# Patient Record
Sex: FEMALE | Race: BLACK OR AFRICAN AMERICAN | NOT HISPANIC OR LATINO | Employment: PART TIME | URBAN - METROPOLITAN AREA
[De-identification: names, ages, dates, MRNs, and addresses within clinical notes are randomized per-mention and may not be internally consistent; named-entity substitution may affect disease eponyms.]

---

## 2017-11-16 ENCOUNTER — OFFICE VISIT - HEALTHEAST (OUTPATIENT)
Dept: FAMILY MEDICINE | Facility: CLINIC | Age: 47
End: 2017-11-16

## 2017-11-16 DIAGNOSIS — Z71.84 COUNSELING FOR TRAVEL: ICD-10-CM

## 2017-11-16 DIAGNOSIS — K50.00 CROHN'S DISEASE OF SMALL INTESTINE WITHOUT COMPLICATION (H): ICD-10-CM

## 2018-04-19 ENCOUNTER — OFFICE VISIT - HEALTHEAST (OUTPATIENT)
Dept: FAMILY MEDICINE | Facility: CLINIC | Age: 48
End: 2018-04-19

## 2018-04-19 DIAGNOSIS — R05.9 COUGH: ICD-10-CM

## 2018-04-19 DIAGNOSIS — Z12.31 VISIT FOR SCREENING MAMMOGRAM: ICD-10-CM

## 2018-10-26 ENCOUNTER — OFFICE VISIT - HEALTHEAST (OUTPATIENT)
Dept: FAMILY MEDICINE | Facility: CLINIC | Age: 48
End: 2018-10-26

## 2018-10-26 DIAGNOSIS — A08.4 VIRAL GASTROENTERITIS: ICD-10-CM

## 2019-04-29 ENCOUNTER — AMBULATORY - HEALTHEAST (OUTPATIENT)
Dept: CARE COORDINATION | Facility: CLINIC | Age: 49
End: 2019-04-29

## 2019-04-29 DIAGNOSIS — K50.919 CROHN'S DISEASE WITH COMPLICATION, UNSPECIFIED GASTROINTESTINAL TRACT LOCATION (H): ICD-10-CM

## 2019-05-01 ENCOUNTER — COMMUNICATION - HEALTHEAST (OUTPATIENT)
Dept: NURSING | Facility: CLINIC | Age: 49
End: 2019-05-01

## 2019-05-08 ENCOUNTER — AMBULATORY - HEALTHEAST (OUTPATIENT)
Dept: NURSING | Facility: CLINIC | Age: 49
End: 2019-05-08

## 2019-05-13 ENCOUNTER — COMMUNICATION - HEALTHEAST (OUTPATIENT)
Dept: NURSING | Facility: CLINIC | Age: 49
End: 2019-05-13

## 2019-05-28 ENCOUNTER — COMMUNICATION - HEALTHEAST (OUTPATIENT)
Dept: NURSING | Facility: CLINIC | Age: 49
End: 2019-05-28

## 2019-10-29 ENCOUNTER — COMMUNICATION - HEALTHEAST (OUTPATIENT)
Dept: CARE COORDINATION | Facility: CLINIC | Age: 49
End: 2019-10-29

## 2019-10-30 ENCOUNTER — OFFICE VISIT - HEALTHEAST (OUTPATIENT)
Dept: FAMILY MEDICINE | Facility: CLINIC | Age: 49
End: 2019-10-30

## 2019-10-30 DIAGNOSIS — L50.9 URTICARIA: ICD-10-CM

## 2019-10-30 DIAGNOSIS — K50.90 EXACERBATION OF CROHN'S DISEASE, WITHOUT COMPLICATIONS (H): ICD-10-CM

## 2019-11-14 ENCOUNTER — OFFICE VISIT - HEALTHEAST (OUTPATIENT)
Dept: FAMILY MEDICINE | Facility: CLINIC | Age: 49
End: 2019-11-14

## 2019-11-14 DIAGNOSIS — Z12.31 VISIT FOR SCREENING MAMMOGRAM: ICD-10-CM

## 2019-11-14 DIAGNOSIS — Z09 HOSPITAL DISCHARGE FOLLOW-UP: ICD-10-CM

## 2019-11-14 DIAGNOSIS — K50.90 EXACERBATION OF CROHN'S DISEASE, WITHOUT COMPLICATIONS (H): ICD-10-CM

## 2019-11-14 LAB
ALBUMIN SERPL-MCNC: 2.6 G/DL (ref 3.5–5)
ALP SERPL-CCNC: 72 U/L (ref 45–120)
ALT SERPL W P-5'-P-CCNC: 25 U/L (ref 0–45)
ANION GAP SERPL CALCULATED.3IONS-SCNC: 10 MMOL/L (ref 5–18)
AST SERPL W P-5'-P-CCNC: 13 U/L (ref 0–40)
BILIRUB SERPL-MCNC: 0.3 MG/DL (ref 0–1)
BUN SERPL-MCNC: 14 MG/DL (ref 8–22)
CALCIUM SERPL-MCNC: 8.5 MG/DL (ref 8.5–10.5)
CHLORIDE BLD-SCNC: 100 MMOL/L (ref 98–107)
CO2 SERPL-SCNC: 29 MMOL/L (ref 22–31)
CREAT SERPL-MCNC: 0.58 MG/DL (ref 0.6–1.1)
ERYTHROCYTE [DISTWIDTH] IN BLOOD BY AUTOMATED COUNT: 14.7 % (ref 11–14.5)
GFR SERPL CREATININE-BSD FRML MDRD: >60 ML/MIN/1.73M2
GLUCOSE BLD-MCNC: 98 MG/DL (ref 70–125)
HCT VFR BLD AUTO: 39.9 % (ref 35–47)
HGB BLD-MCNC: 13 G/DL (ref 12–16)
MCH RBC QN AUTO: 26.4 PG (ref 27–34)
MCHC RBC AUTO-ENTMCNC: 32.5 G/DL (ref 32–36)
MCV RBC AUTO: 81 FL (ref 80–100)
PLATELET # BLD AUTO: 251 THOU/UL (ref 140–440)
PMV BLD AUTO: 8.3 FL (ref 7–10)
POTASSIUM BLD-SCNC: 4.3 MMOL/L (ref 3.5–5)
PROT SERPL-MCNC: 5.3 G/DL (ref 6–8)
RBC # BLD AUTO: 4.92 MILL/UL (ref 3.8–5.4)
SODIUM SERPL-SCNC: 139 MMOL/L (ref 136–145)
WBC: 4.1 THOU/UL (ref 4–11)

## 2019-11-18 ENCOUNTER — COMMUNICATION - HEALTHEAST (OUTPATIENT)
Dept: FAMILY MEDICINE | Facility: CLINIC | Age: 49
End: 2019-11-18

## 2019-12-03 ENCOUNTER — RECORDS - HEALTHEAST (OUTPATIENT)
Dept: ADMINISTRATIVE | Facility: OTHER | Age: 49
End: 2019-12-03

## 2020-11-12 ENCOUNTER — OFFICE VISIT - HEALTHEAST (OUTPATIENT)
Dept: FAMILY MEDICINE | Facility: CLINIC | Age: 50
End: 2020-11-12

## 2020-11-12 DIAGNOSIS — K52.9 GASTROENTERITIS: ICD-10-CM

## 2020-11-12 ASSESSMENT — MIFFLIN-ST. JEOR: SCORE: 962.44

## 2021-02-11 ENCOUNTER — COMMUNICATION - HEALTHEAST (OUTPATIENT)
Dept: SCHEDULING | Facility: CLINIC | Age: 51
End: 2021-02-11

## 2021-02-16 ENCOUNTER — OFFICE VISIT - HEALTHEAST (OUTPATIENT)
Dept: FAMILY MEDICINE | Facility: CLINIC | Age: 51
End: 2021-02-16

## 2021-02-16 DIAGNOSIS — Z09 HOSPITAL DISCHARGE FOLLOW-UP: ICD-10-CM

## 2021-02-16 DIAGNOSIS — K50.00 CROHN'S DISEASE OF SMALL INTESTINE WITHOUT COMPLICATION (H): ICD-10-CM

## 2021-02-16 DIAGNOSIS — K56.609 SBO (SMALL BOWEL OBSTRUCTION) (H): ICD-10-CM

## 2021-02-23 ENCOUNTER — RECORDS - HEALTHEAST (OUTPATIENT)
Dept: ADMINISTRATIVE | Facility: OTHER | Age: 51
End: 2021-02-23

## 2021-02-23 LAB
ALT SERPL W/O P-5'-P-CCNC: 31 U/L (ref 0–78)
AST SERPL-CCNC: 14 U/L (ref 0–37)
CREAT SERPL-MCNC: 0.54 MG/DL (ref 0.6–1.02)
GFR ESTIMATE EXT - HISTORICAL: >60 ML/MIN/1.73M2
GFR ESTIMATE, IF BLACK EXT - HISTORICAL: >60 ML/MIN/1.73M2

## 2021-03-03 ENCOUNTER — RECORDS - HEALTHEAST (OUTPATIENT)
Dept: HEALTH INFORMATION MANAGEMENT | Facility: CLINIC | Age: 51
End: 2021-03-03

## 2021-05-20 ENCOUNTER — RECORDS - HEALTHEAST (OUTPATIENT)
Dept: ADMINISTRATIVE | Facility: OTHER | Age: 51
End: 2021-05-20

## 2021-05-28 NOTE — PROGRESS NOTES
"On 4/29/19, the patient was referred to Clinic Care Coordination via Hospital Discharge.    Order Diagnosis:  Crohn's disease with complication, unspecified gastrointestinal track location (H)    Order Comments:  None    Upon chart review I learned:    Patient was in [facility] from 4/25/19-4/28/19.    She was discharged with the following recommendations:    Follow up with PCP within 7 days.    Follow up with the [facility] in 3-4 weeks.    Get a repeat Chest CT in 6-12 months for solitary pulmonary nodule.    Recommend an out-patient pelvic MRI due to heterogenous uterus.    Care Guide attempted to call the patient.  The patient answered; however, stated she does not speak English.  I tried to ask what language she spoke; however, she only continued to repeat \"no English.\"  Her chart does not list any language other than English.    I consulted with Dr. Shultz.  She informed me the patient's  speaks English.  She doesn't know what the patient's language is.  She recommended I call the patient's .    Care Guide called the patient's , Merline.  He informed me the patient speaks Tigrinya.  He requested I inform him what I am calling about and he will relay it to his wife.    Clinic Care Coordination was described.   Merline will relay the information to the patient and notify me with her decision.    He had questions in regard to the recent hospitalization.  I discussed the patient needs a hospital follow up appointment with Dr. Shultz.  During this appointment, Dr. Shultz will review what transpired in the hospital and answer any additional questions they may have.  I assisted in coordinating a hospital follow up appointment for 5/8/19 at 3:00 with Dr. Shultz.  I had offered sooner appointments; however, Merline request the appointment be after Tuesday, 5/7/19.  He also stated the patient is only available for afternoon appointments, 3:00 or " later.    Merline informed me he will contact me with the patient's decision around enrollment with [facility] after the hospital follow up appointment on 5/8/19.  I informed him I will contact him a few days later if I haven't already heard back from him.    I have updated the patient's demographics page to reflect the language she speaks, Tigrinya.    [facility] Referral: Attempt 1  If the patient's  is calling me back, please transfer him to me, Jen Lara, at [phone].    Plan for Next Outreach:  1.  Review patient enrolling with Dr. Shultz so she can further discuss it during the patient's appointment.    Pending Appointments:  5/8/19 at 3:00 with Dr. Shultz for Hospital Follow Up  _________________________  Next Outreach Date: 5/13/19

## 2021-05-28 NOTE — PROGRESS NOTES
Care Guide spoke with Dr. Shultz's Clinical Assistant, Alley.  Informed her:  The patient was referred to CCC via hospital discharge.  I spoke with the patient's  and learned the language she speaks is Tigrinya.  I updated the chart to reflect this vs English.    I discussed the patient enrolling with [facility].  The  had many hospital discharge questions.  I assisted in scheduling the hospital follow up appointment this afternoon.    I requested Alley to let Dr. Shultz know I am trying to enroll the patient and if she could further discuss it, it would be appreciated.  I can try to meet with the patient while she is in clinic this afternoon, if I am available.

## 2021-05-28 NOTE — PROGRESS NOTES
On 4/29/19, the patient was referred to Clinic Care Coordination via Hospital Discharge.    Order Diagnosis:  Crohn's disease with complication, unspecified gastrointestinal track location (H)    Order Comments:  None    The patient no showed her hospital follow up appointment with Dr. Shultz on 5/8/19.     ID: [id number]  Agency: [employer]    [facility] Referral: Attempt 2  Care Guide called and left a message for the patient in order to discuss enrollment with Clinic Care Coordination.    If the patient is returning my call, please transfer her to me, Jen Lara, at [phone].    Plan for Next Outreach:  1.  Offer to assist in rescheduling the missed hospital follow up appointment with Dr. Shultz.    Pending Appointments:  None  _________________________  Next Outreach Date: 5/24/19

## 2021-05-29 NOTE — PROGRESS NOTES
On 4/29/19, the patient was referred to Clinic Care Coordination via Hospital Discharge.    Order Diagnosis:  Crohn's disease with complication, unspecified gastrointestinal track location (H)    Order Comments:  None     Name: Dave  ID: [id number]  Agency: [employer]    [facility] Referral: Attempt 3  Community Health Worker called and left a message for the patient in order to discuss enrollment with Clinic Care Coordination.  Per [facility] standard work, I have made 3 attempts to reach the patient to discuss enrollment and have been unsuccessful in reaching her.  At this time I will cease trying to reach the patient.    If the patient is returning my call, please transfer her to me, Jen Lara, at [phone].    In the future, if the patient's provider feels enrollment with [facility] would be beneficial to the patient, a new order can be placed and I will begin trying to reach the patient again.    I have sent a copy of this note to Dr. Shultz as an FYI.    5/28/19: Unreachable for enrollment with Clinic Care Coordination

## 2021-05-30 ENCOUNTER — RECORDS - HEALTHEAST (OUTPATIENT)
Dept: ADMINISTRATIVE | Facility: CLINIC | Age: 51
End: 2021-05-30

## 2021-05-31 ENCOUNTER — RECORDS - HEALTHEAST (OUTPATIENT)
Dept: ADMINISTRATIVE | Facility: CLINIC | Age: 51
End: 2021-05-31

## 2021-05-31 VITALS — BODY MASS INDEX: 19.42 KG/M2 | WEIGHT: 101.13 LBS

## 2021-06-01 VITALS — WEIGHT: 103.06 LBS | BODY MASS INDEX: 19.47 KG/M2

## 2021-06-02 VITALS — WEIGHT: 91 LBS | BODY MASS INDEX: 17.19 KG/M2

## 2021-06-02 NOTE — PROGRESS NOTES
Walk In Care Note                                                                                 Date of Visit: 10/30/2019     Chief Complaint   Brenda Archuleta is a(n) 49 y.o. Black or  female who presents to [facility] with the following complaint(s):  Rash (started last evening and is itchy all over   Denies fevers )       Assessment and Plan   1. Urticaria  - cetirizine (ZYRTEC) 10 MG tablet; Take 1 tablet (10 mg total) by mouth 2 (two) times a day.  Dispense: 60 tablet; Refill: 0  - diphenhydrAMINE (BENADRYL) 25 mg capsule; Take 1 capsule (25 mg total) by mouth every 4 (four) hours as needed for itching.  Dispense: 30 capsule; Refill: 1  - famotidine (PEPCID) 20 MG tablet; Take 1 tablet (20 mg total) by mouth 2 (two) times a day.  Dispense: 60 tablet; Refill: 0    2. Exacerbation of Crohn's disease, without complications (H)      Patient with Crohn's Disease, recently hospitalized for flare and started on prolonged prednisone taper, presenting with 1-day history of diffuse urticaria.  Patient has no signs of angioedema at this time.  Her blood pressure is slightly low today, but this is consistent with her blood pressure readings during her recent hospitalization.  Patient reports a remote history of similar rash, related to clothing that she was wearing at the time.  Patient has taken prednisone multiple times in the past, most recently in April, without issue.  Therefore doubt that urticaria is related to prednisone, and would be hesitant to discontinue prednisone due to risk of Crohn's Disease flare.  Treating urticaria with both H1 and H2 histamine blockade with cetirizine and famotidine twice daily for the next 30 days.  Have also prescribed diphenhydramine to be used as needed for pruritus.  Patient is scheduled for follow-up with her primary care provider tomorrow.  Recommended evaluation in the ED if her symptoms persist/worsen in the interim.    Counseled patient regarding assessment  and plan for evaluation and treatment. Questions were answered. See AVS for the specific written instructions and educational handout(s) regarding urticaria that were provided at the conclusion of the visit.     Discussed signs / symptoms that warrant urgent / emergent medical attention.     Follow up with primary care as scheduled tomorrow.     History of Present Illness   Primary symptom: Rash  Onset: Yesterday  Location: Initially on the face, then spread to the rest of the body over the course of the day.   Appearance: Red patches  Progression: Worsening  Pruritic: Yes  Painful: Tender  Discharge: No  Bleeding: No  Fevers: No  Chills: Yes  Associated sore throat: Mild  Preceding flu-like symptoms: No  Lip / tongue / face swelling: No  Throat / neck swelling: No  Difficulty speaking: No  Difficulty swallowing: No  Difficulty breathing: No  Additional symptoms: None. Was hospitalized from 10/25/2019-10/27/2019 for management of a Crohn's flare.  Abdominal pain has improved since initially being treated with IV methylprednisolone and transitioning to oral prednisone prior to discharge.  Home therapies utilized: None  History of similar rash: Yes, over 20 years ago, reportedly due to clothing she was wearing.   Contacts with similar rash: No  Known environmental exposure: No  New medication use: Started prednisone 20 mg two times a day on 10/26/2019 while hospitalized for a flare of Crohn's Disease. Has taken prednisone previously, most recently in April 2019, without any history of medication reaction.  New detergent / fabric softener exposure: No  New personal hygiene product exposure: No  Pet / animal exposure: No  Tobacco use / exposure: No     Review of Systems   Review of Systems   All other systems reviewed and are negative.       Physical Exam   Vitals:    10/30/19 0817   BP: (!) 84/52   Patient Site: Right Arm   Patient Position: Sitting   Cuff Size: Adult Small   Pulse: (!) 105   Resp: 18   Temp: 97.6  F  (36.4  C)   TempSrc: Oral   SpO2: 99%   Weight: (!) 88 lb 3.2 oz (40 kg)     Physical Exam  Vitals signs and nursing note reviewed.   Constitutional:       General: She is not in acute distress.     Appearance: She is well-developed and underweight. She is not ill-appearing or toxic-appearing.   HENT:      Head: Normocephalic and atraumatic.      Right Ear: Tympanic membrane, ear canal and external ear normal.      Left Ear: Tympanic membrane, ear canal and external ear normal.      Nose: No mucosal edema or rhinorrhea.      Mouth/Throat:      Lips: Pink. No lesions.      Mouth: Mucous membranes are moist. No oral lesions.      Tongue: No lesions.      Palate: No lesions.      Pharynx: Oropharynx is clear. No oropharyngeal exudate or posterior oropharyngeal erythema.   Eyes:      General: Lids are normal. No scleral icterus.     Conjunctiva/sclera: Conjunctivae normal.   Neck:      Musculoskeletal: Neck supple. No edema or erythema.   Cardiovascular:      Rate and Rhythm: Normal rate and regular rhythm.      Heart sounds: S1 normal and S2 normal. No murmur. No friction rub. No gallop.    Pulmonary:      Effort: Pulmonary effort is normal.      Breath sounds: Normal breath sounds. No stridor. No wheezing, rhonchi or rales.   Abdominal:      General: Bowel sounds are normal. There is no distension.      Palpations: Abdomen is soft. There is no hepatomegaly, splenomegaly or mass.      Tenderness: There is tenderness (mild) in the periumbilical area. There is no right CVA tenderness, left CVA tenderness, guarding or rebound.   Lymphadenopathy:      Cervical: No cervical adenopathy.   Skin:     General: Skin is warm and dry.      Coloration: Skin is not pale.      Findings: Rash present. Rash is urticarial (on the face, neck, back, abdomen, upper extremities, and lower extremities).   Neurological:      General: No focal deficit present.      Mental Status: She is alert and oriented to person, place, and time.           Diagnostic Studies   Laboratory:  N/A  Radiology:  N/A  Electrocardiogram:  N/A     Procedure Note   N/A     Pertinent History   The following portions of the patient's history were reviewed and updated as appropriate: allergies, current medications, past family history, past medical history, past social history, past surgical history and problem list.    Patient has Crohn's Disease; Latent Tuberculosis; Crohn's colitis, unspecified complication (H); Abdominal pain, epigastric; Acute Crohn's disease without complication (H); Crohn's disease of colon, unspecified complication (H); and Exacerbation of Crohn's disease, without complications (H) on their problem list.    Patient has a past medical history of Breast cyst (2009), Crohn's disease (H), and Latent Tuberculosis.    Patient has a past surgical history that includes Breast cyst excision (Left, 2009) and Partial small bowel resection.    Patient's family history is not on file.    Patient reports that she has never smoked. She has never used smokeless tobacco. She reports that she does not drink alcohol or use drugs.     Portions of this note have been dictated using voice recognition software. Any grammatical or context distortions are unintentional and inherent to the software.     Gustavo Sesay MD  [facility]

## 2021-06-02 NOTE — PROGRESS NOTES
TCM DISCHARGE FOLLOW UP CALL    Discharge Date:  10/27/2019  Reason for hospital stay (discharge diagnosis)::  Crohn's disease of colon  Are you feeling better, the same or worse since your discharge?:  Patient is feeling better  Do you feel like you have a plan in the event of a health emergency?: Yes (911)    As part of your discharge plan, were  home care services ordered for you?: No    Did you receive any new medications, or was there a change to your medications?: Yes    Are you taking those medications, or do you have any established regiment?:  Pt states she is taking prednisone 2 tabs (20 mg) twice a day for 1 week, then will decrease per taper instructions.  Do you have any follow up visits scheduled with your PCP or Specialist?:  Yes, with PCP  (RN) Is PCP appt scheduled soon enough (within 14 days of discharge date)?: Yes (Dr. Shultz 10/31/19)    RN NOTES::  Pt has not heard from GI clinic yet.      Kath Tijerina RN Care Manager, Population Health

## 2021-06-03 VITALS
BODY MASS INDEX: 14.84 KG/M2 | WEIGHT: 89.19 LBS | DIASTOLIC BLOOD PRESSURE: 58 MMHG | HEART RATE: 108 BPM | SYSTOLIC BLOOD PRESSURE: 96 MMHG

## 2021-06-03 VITALS
DIASTOLIC BLOOD PRESSURE: 52 MMHG | RESPIRATION RATE: 18 BRPM | TEMPERATURE: 97.6 F | HEART RATE: 105 BPM | WEIGHT: 88.2 LBS | BODY MASS INDEX: 14.68 KG/M2 | SYSTOLIC BLOOD PRESSURE: 84 MMHG | OXYGEN SATURATION: 99 %

## 2021-06-03 NOTE — PROGRESS NOTES
Hospital Follow-up Visit:    Assessment/Plan:     Hospital discharge follow-up  -     HM2(CBC w/o Differential)  -     Comprehensive Metabolic Panel  Improving  Hospital notes, lab results, imagings were reviewed.    Post Discharge Medication Reconciliation Status: discharge medications reconciled, continue medications without change    Exacerbation of Crohn's disease, without complications (H)  Still on prednisone taper  Will assist her with an appointment to see [facility]     Visit for screening mammogram  -     Mammo Screening Bilateral; Future    Subjective:     Brenda Archuleta is a 49 y.o. female who presents for a hospital discharge follow up.     She presented for evaluation of abdominal pain, vomiting, diarrhea, weight loss at the emergency department on 10/25. Labs demonstrated hemoglobin 13, no leukocytosis, hypoalbuminemia 2.3.  CT of the abdomen and pelvis demonstrated active Crohn's in the small bowel. Patient's symptoms improved quickly on IV Solu-Medrol. Patient was switched to oral prednisone taper and will need to follow-up in the IBD clinic to discuss maintenance treatment. Patient was having mildly symptomatic hypotension, probably multifactorial due to poor oral intake and pain medications. It responded well to IV fluids. On the day of discharge patient did not have any abdominal pain, was tolerating regular diet and ambulating without difficulties. She was [facility] on 10/27.     She has not scheduled an appointment with her specialist yet. She is feeling nauseous today. She has been taking her medication. She is still taking a prednisone. She has started eating again. Her stomach pain is under control. She no longer is experiencing diarrhea.     ROS:   All other systems are negative.     Hospital/Nursing Home/IP Rehab Facility: [facility]  Date of Admission: 10/26/19   Date of Discharge:10/27/19  Reason(s) for Admission:acute  chron's flare up            Do you have any  problems taking your medication regularly?  None       Have you had any changes in your medication since discharge? None       Have you had any difficulty following your discharge or treatment plan?  No    Summary of hospitalization:  Hospital discharge summary reviewed  Diagnostic Tests/Treatments reviewed.  Follow up needed: Patient has scheduled follow up with [facility] for further care.   Other Healthcare Providers Involved in Patient's Care: Patient Care Team:  Wai Jauregui MD as PCP - General  Wai Jauregui MD as Assigned PCP      Update since discharge: {improved     Post Discharge Medication Reconciliation: discharge medications reconciled, continue medications without change  Plan of care communicated with: patient    Objective:     Vitals:    11/14/19 1443   BP: 96/58   Pulse: (!) 108   Weight: (!) 89 lb 3 oz (40.5 kg)   LMP: 01/19/2018         Physical Exam:  Constitutional: Patient is oriented to person, place, and time. Patient appears well-developed and well-nourished. No distress.   Cardiovascular: Normal rate.  Pulmonary/Chest: Effort normal. No respiratory distress.   Neurological: Patient is alert and oriented to person, place, and time.      Coding guidelines for this visit:  Type of Medical   Decision Making Face-to-Face Visit       within 7 Days of discharge Face-to-Face Visit        within 14 days of discharge   Moderate Complexity [id number]   High Complexity 40798 [id number]       ADDITIONAL HISTORY SUMMARIZED (2): Reviewed discharge summary from 10/27/19 about Crohn's flare up.   DECISION TO OBTAIN EXTRA INFORMATION (1): None.   RADIOLOGY TESTS (1): Reviewed CT Abdomen Pelvis from 10/25/19 showing flare up of Crohns.   LABS (1): Reviewed labs from 10/25/19 and ordered labs today.   MEDICINE TESTS (1): None.  INDEPENDENT REVIEW (2 each): None.     Aura CRUZ, am scribing for and in the presence of, Dr. Shultz.    IDr. Shultz, personally performed  the services described in this documentation, as scribed by Aura Galan in my presence, and it is both accurate and complete.    Total data points: 4    Electronically signed by Wai Yarbrough MD 11/14/19 2:38 PM

## 2021-06-05 VITALS
BODY MASS INDEX: 16.86 KG/M2 | OXYGEN SATURATION: 95 % | DIASTOLIC BLOOD PRESSURE: 66 MMHG | WEIGHT: 89.3 LBS | SYSTOLIC BLOOD PRESSURE: 94 MMHG | HEART RATE: 106 BPM | TEMPERATURE: 97.9 F | HEIGHT: 61 IN

## 2021-06-05 VITALS
SYSTOLIC BLOOD PRESSURE: 100 MMHG | TEMPERATURE: 97.6 F | DIASTOLIC BLOOD PRESSURE: 60 MMHG | HEART RATE: 88 BPM | OXYGEN SATURATION: 97 % | WEIGHT: 91.31 LBS | BODY MASS INDEX: 17.83 KG/M2

## 2021-06-13 NOTE — PROGRESS NOTES
"ASSESSMENT:  1. Gastroenteritis    She is feeling better now, back to her baseline.  She needs a note for work and I did provide that for her and that can be printed out given to the patient today.  If she is having further problems or does seem to be getting worse instead of better she will let us know.          PLAN:  There are no Patient Instructions on file for this visit.    No orders of the defined types were placed in this encounter.    Medications Discontinued During This Encounter   Medication Reason     predniSONE (DELTASONE) 10 mg tablet Therapy completed       No follow-ups on file.    CHIEF COMPLAINT:  Chief Complaint   Patient presents with     Abdominal Pain     Abdomen pain and vomitting x 3 days, stomach pain after vomiting, tired, will have chills all the way up her back and back pain after vomiting.        HISTORY OF PRESENT ILLNESS:  Brenda is a 50 y.o. female presenting to the clinic today with some nausea and vomiting recently.  She does have a history of Crohn's disease but that is been pretty stable.  In the last couple of days she has had several issues with vomiting and some nausea.  She has had slight diarrhea but all these things seem to be getting better.  She thinks it was something that she ate.  She said no fevers or chills.  She has had no cough or other Covid symptoms.  She needs a note to go back to work as she missed 3 days this week.    REVIEW OF SYSTEMS:     All other systems are negative.    PFSH:      TOBACCO USE:  Social History     Tobacco Use   Smoking Status Never Smoker   Smokeless Tobacco Never Used       VITALS:  Vitals:    11/12/20 1433   BP: 94/66   Patient Site: Left Arm   Patient Position: Sitting   Cuff Size: Child   Pulse: (!) 106   Temp: 97.9  F (36.6  C)   SpO2: 95%   Weight: (!) 89 lb 4.8 oz (40.5 kg)   Height: 5' 1\" (1.549 m)     Wt Readings from Last 3 Encounters:   11/12/20 (!) 89 lb 4.8 oz (40.5 kg)   11/14/19 (!) 89 lb 3 oz (40.5 kg)   10/30/19 (!) 88 lb " 3.2 oz (40 kg)     Body mass index is 16.87 kg/m .    PHYSICAL EXAM:  Constitutional:  Well appearing patient in no acute distress.  Vitals:  Per nursing notes.    HEENT:  Normocephalic, atraumatic.  Ears are clear bilaterally, with no fluid or redness, and landmarks visible.  Pupils are equal and reactive to light, extraocular muscles intact, visual fields are full.  Nose is normal, and oropharynx is clear without redness.    Neck is without lymphadenopathy.    Lungs:  Clear to auscultation bilaterally without wheezes, rales or rhonchi.   Cardiac:  Regular rate and rhythm without murmurs, rubs, or gallops.     Legs show no cyanosis, clubbing or edema.  Palpation of the distal pulses are normal and symmetric.          MEDICATIONS:  Current Outpatient Medications   Medication Sig Dispense Refill     cholecalciferol, vitamin D3, (VITAMIN D3 ORAL) Take by mouth.       No current facility-administered medications for this visit.

## 2021-06-14 NOTE — PROGRESS NOTES
ASSESSMENT/PLAN:  Counseling for travel  Discussed travel safety and prevention:  malaria chemoprophylaxis.  We discussed food and waterborne precautions.  Personal safety, sunscreen, insect precautions, traveler's diarrhea prevention & treatment.  [facility] travel information provided.  She received her flu vaccine at work  -     TYPHOID, INACTIVE  -     mefloquine (LARIAM) 250 mg tablet; Take 1 tablet (250 mg total) by mouth every 7 days. Take one tablet weekly until finish  Dispense: 14 tablet; Refill: 0  -     loperamide (IMODIUM A-D) 2 mg tablet; 4 mg after first loose stool, followed by 2 mg after each subsequent stool (maximum dose: 8 mg/day)  Dispense: 30 tablet; Refill: 0  -     ciprofloxacin HCl (CIPRO) 500 MG tablet; Take 1 tablet (500 mg total) by mouth 2 (two) times a day for 3 days.  Dispense: 6 tablet; Refill: 0    Crohn's disease of small intestine without complication  Stable  Not on medication    Orders Placed This Encounter   Procedures     TYPHOID, INACTIVE         CHIEF COMPLAINT:  Chief Complaint   Patient presents with     Travel Consult     [facility] 11/29/17 for 2 months       HISTORY OF PRESENT ILLNESS:  Brenda is a 47 y.o. female presenting to the clinic today for a travel consult. She will be traveling to [facility] to visit family. She will be visiting family there. She is traveling alone; her  and children are staying here. She will need malaria prophylaxis. She is in need of a typhoid vaccine. She already had her seasonal flu shot.   Destination: [facility]  Departure Date: November 29, 2017  Duration: 2 months      REVIEW OF SYSTEMS:   Constitutional: Negative.   HENT: Negative.   Eyes: Negative.   Respiratory: Negative.   Cardiovascular: Negative.   Gastrointestinal: Negative.   Endocrine: Negative.   Genitourinary: Negative.   Musculoskeletal: Negative.   Skin: Negative.   Allergic/Immunologic: Negative.   Neurological: Negative.   Hematological: Negative.   Psychiatric/Behavioral:  Negative.   All other systems are negative.    PFSH:  No new history.     TOBACCO USE:  History   Smoking Status     Never Smoker   Smokeless Tobacco     Never Used       VITALS:  Vitals:    11/16/17 1113   BP: 124/74   Pulse: 68   Weight: 101 lb 2 oz (45.9 kg)     Wt Readings from Last 3 Encounters:   11/16/17 101 lb 2 oz (45.9 kg)   11/03/16 102 lb 4.8 oz (46.4 kg)   04/27/16 101 lb 7 oz (46 kg)       PHYSICAL EXAM:  Constitutional: Patient is oriented to person, place, and time. Patient appears well-developed and well-nourished. No distress.   Head: Normocephalic and atraumatic.   Right Ear: External ear normal. Ear canal and TM normal.   Left Ear: External ear normal. Ear canal and TM normal.   Nose: Nose normal.   Mouth/Throat: Oropharynx is clear and moist. No oropharyngeal exudate.   Eyes: Conjunctivae and EOM are normal. Pupils are equal, round, and reactive to light. Right eye exhibits no discharge. Left eye exhibits no discharge. No scleral icterus.   Cardiovascular: Normal rate, regular rhythm, normal heart sounds and intact distal pulses. No murmur heard.   Pulmonary/Chest: Effort normal and breath sounds normal. No stridor. No respiratory distress. Patient has no wheezes, no rales, exhibits no tenderness.   Abdominal: Soft. Bowel sounds are normal. Patient exhibits no distension and no mass. There is no tenderness. There is no rebound and no guarding.   Skin: Skin is warm and dry. No rash noted. Patient is not diaphoretic. No erythema. No pallor.        ADDITIONAL HISTORY SUMMARIZED (2): None.  DECISION TO OBTAIN EXTRA INFORMATION (1): None.   RADIOLOGY TESTS (1): None.  LABS (1): None.  MEDICINE TESTS (1): None.  INDEPENDENT REVIEW (2 each): None.     IRebecca, am scribing for and in the presence of, Dr. Shultz.    Wai CRUZ MD , personally performed the services described in this documentation, as scribed by Rebecca Snider in my presence, and it is both  accurate and complete.    MEDICATIONS:  Current Outpatient Prescriptions   Medication Sig Dispense Refill     CHOLECALCIFEROL, VITAMIN D3, (VITAMIN D3 ORAL) Take by mouth.       ciprofloxacin HCl (CIPRO) 500 MG tablet Take 1 tablet (500 mg total) by mouth 2 (two) times a day for 3 days. 6 tablet 0     loperamide (IMODIUM A-D) 2 mg tablet 4 mg after first loose stool, followed by 2 mg after each subsequent stool (maximum dose: 8 mg/day) 30 tablet 0     mefloquine (LARIAM) 250 mg tablet Take 1 tablet (250 mg total) by mouth every 7 days. Take one tablet weekly until finish 14 tablet 0     triamcinolone (KENALOG) 0.1 % cream Apply three times daily 30 g 0     No current facility-administered medications for this visit.        Total data points: 0

## 2021-06-15 NOTE — PROGRESS NOTES
Hospital Follow-up Visit:    Assessment/Plan:     Brenda was seen today for hospital visit follow up.    Hospital discharge follow-up  Improving  Hospital notes, lab results, operative notes, imagings were reviewed.    Post Discharge Medication Reconciliation Status: discharge medications reconciled, continue medications without change    SBO (small bowel obstruction) (H)  Feeling better.  Tolerating oral diet and having adequate bowel movements  Letter was provided to her to return to work on March 1, 2021    Crohn's disease of small intestine without complication (H)  She is on a prednisone taper.  She has follow-up with [place] [facility] next week     Subjective:     Brenda Archuleta is a 50 y.o. female who presents for a hospital discharge follow up.      Hospital/Nursing Home/ Rehab Facility: [facility]  Date of Admission: 2/10/2021  Date of Discharge:2/12/2021  Reason(s) for Admission: stomach pain            Do you have any problems taking your medication regularly?  None       Have you had any changes in your medication since discharge? None       Have you had any difficulty following your discharge or treatment plan?  No    Summary of hospitalization:  Hospital discharge summary reviewed  Diagnostic Tests/Treatments reviewed.  Follow up needed: has f/u with [facility] next week  Other Healthcare Providers Involved in Patient's Care: Patient Care Team:  Wai Jauregui MD as PCP - Ge Puente MD as Assigned PCP      Update since discharge: {improved     Post Discharge Medication Reconciliation: discharge medications reconciled, continue medications without change  Plan of care communicated with: patient    Objective:     Vitals:    02/16/21 1310   BP: 100/60   Pulse: 88   Temp: 97.6  F (36.4  C)   TempSrc: Oral   SpO2: 97%   Weight: (!) 91 lb 5 oz (41.4 kg)   LMP: 01/19/2018         Physical Exam:    Objective:    Physical Exam   Vitals:    02/16/21 1310   BP: 100/60    Pulse: 88   Temp: 97.6  F (36.4  C)   SpO2: 97%      Constitutional: Patient is oriented to person, place, and time. Patient appears well-developed and well-nourished. No distress.   Head: Normocephalic and atraumatic.   Right Ear: External ear normal.   Left Ear: External ear normal.   Eyes: Conjunctivae and EOM are normal. Right eye exhibits no discharge. Left eye exhibits no discharge. No scleral icterus.   Neck: Neck supple. No JVD present. No tracheal deviation present. No thyromegaly present.   Cardiovascular: Normal rate, regular rhythm, normal heart sounds and intact distal pulses. No murmur heard.   Pulmonary/Chest: Effort normal and breath sounds normal. No stridor. No respiratory distress. Patient has no wheezes, no rales, exhibits no tenderness.   Abdominal: Soft. Bowel sounds are normal. Patient exhibits no distension and no mass. There is no tenderness. There is no rebound and no guarding.   Skin: Skin is warm and dry. No rash noted. Patient is not diaphoretic. No erythema. No pallor.         Coding guidelines for this visit:  Type of Medical   Decision Making Face-to-Face Visit       within 7 Days of discharge Face-to-Face Visit        within 14 days of discharge   Moderate Complexity [id number]   High Complexity [id number]       Electronically signed by Wai Yarbrough MD 02/16/21 1:06 PM

## 2021-06-16 PROBLEM — K50.90: Status: ACTIVE | Noted: 2019-10-27

## 2021-06-16 PROBLEM — K56.609 SBO (SMALL BOWEL OBSTRUCTION) (H): Status: ACTIVE | Noted: 2021-02-11

## 2021-06-17 NOTE — PATIENT INSTRUCTIONS - HE
Patient Instructions by Gustavo Sesay MD at 10/30/2019  8:20 AM     Author: Gustavo Sesay MD Service: -- Author Type: Physician    Filed: 10/30/2019  8:51 AM Encounter Date: 10/30/2019 Status: Addendum    : Gustavo Sesay MD (Physician)    Related Notes: Original Note by Gustavo Sesay MD (Physician) filed at 10/30/2019  8:50 AM       -Continue taking prednisone as directed for your Crohn's flare.  -Take both cetirizine and famotidine twice daily for your hives.  These have been prescribed for 30 days.  -Take diphenhydramine as needed for itching.  -Please follow-up with Dr. Shultz as scheduled tomorrow.  Please discuss possible referral to Allergy with her.  -Please go to the nearest Emergency Department if your rash continues to worsen despite taking your medications as prescribed or if you develop swelling of the lips/tongue/throat/neck, difficulty speaking/swallowing/breathing, or other concerning symptoms.  Patient Education     Hives (Adult)  Hives are pink or red bumps on the skin. These bumps are also known as wheals. The bumps can itch, burn, or sting. Hives can occur anywhere on the body. They vary in size and shape and can form in clusters. Individual hives can appear and go away quickly. New hives may develop as old ones fade. Hives are common and usually harmless. Occasionally hives are a sign of a serious allergy.  Hives are often caused by an allergic reaction. It may be an allergic reaction to foods such as fruit, shellfish, chocolate, nuts, or tomatoes. It may be a reaction to pollens, animal fur, or mold spores. Medicines, chemicals, and insect bites can also cause hives. And hives can be caused by hot sun or cold air. The cause of hives can be difficult to find.  You may be given medicines to relieve swelling and itching. Follow all instructions when using these medicines. The hives will usually fade in a few days, but can last up to 2 weeks.  Home care  Follow these  tips:    Try to find the cause of the hives and eliminate it. Discuss possible causes with your healthcare provider. Future reactions to the same allergen may be worse.    Dont scratch the hives. Scratching will delay healing. To reduce itching, apply cool, wet compresses to the skin.    Dress in soft, loose cotton clothing.    Dont bathe in hot water. This can make the itching worse.    Apply an ice pack or cool pack wrapped in a thin towel to your skin. This will help reduce redness and itching. But if your hives were caused by exposure to cold, then do not apply more cold to them.    You may use over-the counter antihistamines to reduce itching. Some older antihistamines, such as diphenhydramine and chlorpheniramine, are inexpensive. But they need to be taken often and may make you sleepy. They are best used at bedtime. Dont use diphenhydramine if you have glaucoma or have trouble urinating because of an enlarged prostate. Newer antihistamines, such as loratadine, cetirizine, and fexofenadine, are generally more expensive. But they tend to have fewer side effects, such as drowsiness. They can be taken less often.    Another type of antihistamine is used to treat heartburn. This type includes ranitidine, nizatidine, famotidine, and cimetidine. These are sometimes used along with the above antihistamines if a single medicine is not working.  Follow-up care  Follow up with your healthcare provider if your symptoms don't get better in 2 days. Ask your provider about allergy testing if you have had a severe reaction, or have had several episodes of hives. He or she can use the allergy testing to find out what you are allergic to.  When to seek medical advice  Call your healthcare provider right away if any of these occur:    Fever of 100.4 F (38.0 C) or higher, or as directed by your healthcare provider    Redness, swelling, or pain    Foul-smelling fluid coming from the rash  Call 911  Call 911 if any of the  following occur:    Swelling of the face, throat, or tongue    Trouble breathing or swallowing    Dizziness, weakness, or fainting  Date Last Reviewed: 9/1/2016 2000-2017 [employer]. [address] [place] [address], [place] [address]. All rights reserved. This information is not intended as a substitute for professional medical care. Always follow your healthcare professional's instructions.

## 2021-06-17 NOTE — PROGRESS NOTES
ASSESSMENT/PLAN:    Cough  Pleasant 47-year-old female presented with 2 weeks of nonproductive cough.  She endorsed reflux symptoms as well.  Suspect that her cough may be related to that of gastroesophageal reflux and upper respiratory tract infection.  We will give her guaifenesin with codeine and omeprazole.  She will follow-up in 2 weeks if she is not better.  She verbalized understanding and agreed with the plan  -     codeine-guaiFENesin (GUAIFENESIN AC)  mg/5 mL liquid; Take 10 mL by mouth 2 (two) times a day as needed for cough.  Dispense: 240 mL; Refill: 0  -     omeprazole (PRILOSEC) 20 MG capsule; Take 1 capsule (20 mg total) by mouth daily.  Dispense: 30 capsule; Refill: 1      CHIEF COMPLAINT:  Chief Complaint   Patient presents with     Cough     x 2 weeks     Emesis     once  yesterday       HISTORY OF PRESENT ILLNESS:  Brenda is a 47 y.o. female presenting to the clinic today for a cough. She has had a cough for the past 2 weeks. She has not had fevers, chills or body aches. She had one episode of post tussive emesis yesterday. The cough has been dry for her. Denies nausea, headache, abdominal pain or diarrhea. She has not noticed any wheezing. She denies any sore throat. She does have history of reflux/heartburn, but is not taking medications. In the morning, she has an odd sensation on the tongue. Denies runny nose, post nasal drip or allergies. Her vitals are stable today.     Health Maintenance: She is due for a mammogram. She is due for a tetanus booster.     REVIEW OF SYSTEMS:   Constitutional: Negative.   HENT: Negative.   Eyes: Negative.   Cardiovascular: Negative.   Endocrine: Negative.   Genitourinary: Negative.   Musculoskeletal: Negative.   Skin: Negative.   Allergic/Immunologic: Negative.   Neurological: Negative.   Hematological: Negative.   Psychiatric/Behavioral: Negative.   All other systems are negative.    PFSH:  No new history.     TOBACCO USE:  History   Smoking Status      Never Smoker   Smokeless Tobacco     Never Used       VITALS:  Vitals:    04/19/18 1555   BP: 104/68   Pulse: 93   Temp: 97.9  F (36.6  C)   TempSrc: Oral   SpO2: 99%   Weight: 103 lb 1 oz (46.7 kg)     Wt Readings from Last 3 Encounters:   04/19/18 103 lb 1 oz (46.7 kg)   02/09/18 105 lb (47.6 kg)   11/16/17 101 lb 2 oz (45.9 kg)       PHYSICAL EXAM:  Constitutional: Patient is oriented to person, place, and time. Patient appears well-developed and well-nourished. No distress.   Head: Normocephalic and atraumatic.   Right Ear: External ear normal. Ear canal and TM normal.   Left Ear: External ear normal. Ear canal and TM normal.   Nose: Nose normal.   Mouth/Throat: Oropharynx is clear and moist. No oropharyngeal exudate.   Eyes: Conjunctivae and EOM are normal. Pupils are equal, round, and reactive to light. Right eye exhibits no discharge. Left eye exhibits no discharge. No scleral icterus.   Cardiovascular: Normal rate, regular rhythm, normal heart sounds and intact distal pulses. No murmur heard.   Pulmonary/Chest: Effort normal and breath sounds normal. No stridor. No respiratory distress. Patient has no wheezes, no rales, exhibits no tenderness.       Results for orders placed or performed during the hospital encounter of 02/09/18   Influenza A/B Rapid Test   Result Value Ref Range    Influenza  A, Rapid Antigen No Influenza A antigen detected No Influenza A antigen detected    Influenza B, Rapid Antigen No Influenza B antigen detected No Influenza B antigen detected   Culture, Urine   Result Value Ref Range    Culture Mixture of urogenital organisms    Basic Metabolic Panel   Result Value Ref Range    Sodium 137 136 - 145 mmol/L    Potassium 3.4 (L) 3.5 - 5.0 mmol/L    Chloride 107 98 - 107 mmol/L    CO2 22 22 - 31 mmol/L    Anion Gap, Calculation 8 5 - 18 mmol/L    Glucose 111 70 - 125 mg/dL    Calcium 8.2 (L) 8.5 - 10.5 mg/dL    BUN 11 8 - 22 mg/dL    Creatinine 0.51 (L) 0.60 - 1.10 mg/dL    GFR MDRD Af  Amer >60 >60 mL/min/1.73m2    GFR MDRD Non Af Amer >60 >60 mL/min/1.73m2   Urinalysis-UC if Indicated   Result Value Ref Range    Color, UA Yellow Colorless, Yellow, Straw, Light Yellow    Clarity, UA Clear Clear    Glucose, UA Negative Negative    Bilirubin, UA Moderate (!) Negative    Ketones, UA Negative Negative    Specific Gravity, UA 1.035 (H) 1.001 - 1.030    Blood, UA Negative Negative    pH, UA 6.0 4.5 - 8.0    Protein, UA 30 mg/dL (!) Negative mg/dL    Urobilinogen, UA <2.0 E.U./dL <2.0 E.U./dL, 2.0 E.U./dL    Nitrite, UA Negative Negative    Leukocytes, UA Small (!) Negative    Bacteria, UA Few (!) None Seen hpf    RBC, UA 3-5 (!) None Seen, 0-2 hpf    WBC, UA 5-10 (!) None Seen, 0-5 hpf    Squam Epithel, UA  (!) None Seen, 0-5 lpf    Mucus, UA Few (!) None Seen lpf   HM1 (CBC with Diff)   Result Value Ref Range    WBC 5.4 4.0 - 11.0 thou/uL    RBC 4.83 3.80 - 5.40 mill/uL    Hemoglobin 12.5 12.0 - 16.0 g/dL    Hematocrit 37.6 35.0 - 47.0 %    MCV 78 (L) 80 - 100 fL    MCH 25.9 (L) 27.0 - 34.0 pg    MCHC 33.2 32.0 - 36.0 g/dL    RDW 14.2 11.0 - 14.5 %    Platelets 227 140 - 440 thou/uL    MPV 11.2 8.5 - 12.5 fL    Neutrophils % 81 (H) 50 - 70 %    Lymphocytes % 13 (L) 20 - 40 %    Monocytes % 5 2 - 10 %    Eosinophils % 1 0 - 6 %    Basophils % 0 0 - 2 %    Neutrophils Absolute 4.3 2.0 - 7.7 thou/uL    Lymphocytes Absolute 0.7 (L) 0.8 - 4.4 thou/uL    Monocytes Absolute 0.3 0.0 - 0.9 thou/uL    Eosinophils Absolute 0.0 0.0 - 0.4 thou/uL    Basophils Absolute 0.0 0.0 - 0.2 thou/uL           ADDITIONAL HISTORY SUMMARIZED (2): None.  DECISION TO OBTAIN EXTRA INFORMATION (1): None.   RADIOLOGY TESTS (1): None.  LABS (1): None.  MEDICINE TESTS (1): None.  INDEPENDENT REVIEW (2 each): None.     Rebecca CRUZ, am scribing for and in the presence of, Dr. Shultz.    aWi CRUZ MD , personally performed the services described in this documentation, as scribed by Rebecca  Landry Snider in my presence, and it is both accurate and complete.    MEDICATIONS:  Current Outpatient Prescriptions   Medication Sig Dispense Refill     codeine-guaiFENesin (GUAIFENESIN AC)  mg/5 mL liquid Take 10 mL by mouth 2 (two) times a day as needed for cough. 240 mL 0     omeprazole (PRILOSEC) 20 MG capsule Take 1 capsule (20 mg total) by mouth daily. 30 capsule 1     No current facility-administered medications for this visit.        Total data points: 0

## 2021-06-19 NOTE — LETTER
Letter by Wai Jauregui MD at      Author: Wai Jauregui MD Service: -- Author Type: --    Filed:  Encounter Date: 11/18/2019 Status: Signed         Brenda KALPESH Archuleta  [address]  [address]  [facility] [address]             November 18, 2019         Dear Ms. Archuleta,    Below are the results from your recent visit:    Resulted Orders   HM2(CBC w/o Differential)   Result Value Ref Range    WBC 4.1 4.0 - 11.0 thou/uL    RBC 4.92 3.80 - 5.40 mill/uL    Hemoglobin 13.0 12.0 - 16.0 g/dL    Hematocrit 39.9 35.0 - 47.0 %    MCV 81 80 - 100 fL    MCH 26.4 (L) 27.0 - 34.0 pg    MCHC 32.5 32.0 - 36.0 g/dL    RDW 14.7 (H) 11.0 - 14.5 %    Platelets 251 140 - 440 thou/uL    MPV 8.3 7.0 - 10.0 fL   Comprehensive Metabolic Panel   Result Value Ref Range    Sodium 139 136 - 145 mmol/L    Potassium 4.3 3.5 - 5.0 mmol/L    Chloride 100 98 - 107 mmol/L    CO2 29 22 - 31 mmol/L    Anion Gap, Calculation 10 5 - 18 mmol/L    Glucose 98 70 - 125 mg/dL    BUN 14 8 - 22 mg/dL    Creatinine 0.58 (L) 0.60 - 1.10 mg/dL    GFR MDRD Af Amer >60 >60 mL/min/1.73m2    GFR MDRD Non Af Amer >60 >60 mL/min/1.73m2    Bilirubin, Total 0.3 0.0 - 1.0 mg/dL    Calcium 8.5 8.5 - 10.5 mg/dL    Protein, Total 5.3 (L) 6.0 - 8.0 g/dL    Albumin 2.6 (L) 3.5 - 5.0 g/dL    Alkaline Phosphatase 72 45 - 120 U/L    AST 13 0 - 40 U/L    ALT 25 0 - 45 U/L    Narrative    Fasting Glucose reference range is 70-99 mg/dL per  American Diabetes Association (ADA) guidelines.       Thank you for allowing me to be a part of your care. This note is to inform you that your results are stable. I will be glad to go over any results with you in details. Please don't hesitate to call the clinic. Have a happy, safe, and healthy year.     Sincerely,        Electronically signed by Wai Yarbrough MD

## 2021-06-21 NOTE — LETTER
Letter by Wai Jauregui MD at      Author: Wai Jauregui MD Service: -- Author Type: --    Filed:  Encounter Date: 2/16/2021 Status: (Other)         February 16, 2021     Patient: Brenda Archuleta   YOB: 1970   Date of Visit: 2/16/2021       To Whom it May Concern:    Brenda Archuleta was seen in my clinic on 2/16/2021. She may return to work 3/1/21.    If you have any questions or concerns, please don't hesitate to call.    Sincerely,         Electronically signed by Wai Yarbrough MD

## 2021-06-21 NOTE — LETTER
Letter by Ge Covarrubias MD at      Author: Ge Covarrubias MD Service: -- Author Type: --    Filed:  Encounter Date: 11/12/2020 Status: (Other)         November 12, 2020     Patient: Brenda Archuleta   YOB: 1970   Date of Visit: 11/12/2020       To Whom It May Concern:    It is my medical opinion that Brenda Archuleta needed to be out of work 11/10-11/12/2020 due to illness.  She may return to work on 11/16/2020..    If you have any questions or concerns, please don't hesitate to call.    Sincerely,        Electronically signed by Ge Covarrubias MD

## 2021-06-21 NOTE — PROGRESS NOTES
WALK IN CARE - VISIT NOTE    HPI    49 yo female presenting for evaluation of:    1. Diarrhea and vomiting  - patient has had 2-3 episodes of loose stools x7 days  - over last 2 days, loose stools have improved  - patient felt mildly nauseous last night and had one episode of emesis this AM  - has been able to eat and hold her food down after that   - had 1 stool this morning that was more formed than prior  - no abdominal pain/cramping, no fevers/chills/sweats  - no light headedness, no dizziness, no fatigue  - no changes to diet, no new foods, no new exposure  - no recent antibiotic use   ROS  Negative except as noted in HPI    OBJECTIVE    Vitals  Vitals:    10/26/18 1026   Pulse: 100   Temp: 97  F (36.1  C)   SpO2: 97%     Vitals:    10/26/18 1026   Pulse: 100   Temp: 97  F (36.1  C)   TempSrc: Oral   SpO2: 97%   Weight: (!) 91 lb (41.3 kg)       Physical Exam  General: No acute distress  Eyes: EOMI, PERRLA, normal conjunctiva, normal sclera   Nose: moist mucosa, no rhinorrhea  Oral: moist oral mucosa, no tonsillar exudates, no erythema  Lymph: no lymphadenopathy  CV: RRR, distal and peripheral pulses in tact and strong  Resp: CTA bilaterally, no wheezing, no rhonchi, no rhales, no respiratory distress  Abdomen: soft, non-tender, non-distended, active bowel sounds   Extrem: no edema, no cyanosis, well-perfused    Labs  N/A      ASSESSMENT/PLAN      # Viral gastroenteritis  - improving  - re-assurance given  - zofran and immodium PRN - 2 day supply given  - symptoms with which to go to ER discussed

## 2021-07-29 ENCOUNTER — TRANSFERRED RECORDS (OUTPATIENT)
Dept: HEALTH INFORMATION MANAGEMENT | Facility: CLINIC | Age: 51
End: 2021-07-29

## 2021-12-02 ENCOUNTER — TRANSFERRED RECORDS (OUTPATIENT)
Dept: HEALTH INFORMATION MANAGEMENT | Facility: CLINIC | Age: 51
End: 2021-12-02
Payer: COMMERCIAL

## 2022-04-12 ENCOUNTER — OFFICE VISIT (OUTPATIENT)
Dept: FAMILY MEDICINE | Facility: CLINIC | Age: 52
End: 2022-04-12

## 2022-04-12 VITALS
DIASTOLIC BLOOD PRESSURE: 62 MMHG | BODY MASS INDEX: 18.12 KG/M2 | OXYGEN SATURATION: 99 % | WEIGHT: 92.8 LBS | SYSTOLIC BLOOD PRESSURE: 98 MMHG | HEART RATE: 95 BPM

## 2022-04-12 DIAGNOSIS — R60.0 PEDAL EDEMA: Primary | ICD-10-CM

## 2022-04-12 LAB
ALBUMIN SERPL-MCNC: 1.2 G/DL (ref 3.5–5)
ALBUMIN UR-MCNC: NEGATIVE MG/DL
ALP SERPL-CCNC: 123 U/L (ref 45–120)
ALT SERPL W P-5'-P-CCNC: 18 U/L (ref 0–45)
ANION GAP SERPL CALCULATED.3IONS-SCNC: 9 MMOL/L (ref 5–18)
APPEARANCE UR: CLEAR
AST SERPL W P-5'-P-CCNC: 23 U/L (ref 0–40)
BACTERIA #/AREA URNS HPF: ABNORMAL /HPF
BILIRUB SERPL-MCNC: 0.3 MG/DL (ref 0–1)
BILIRUB UR QL STRIP: NEGATIVE
BUN SERPL-MCNC: 8 MG/DL (ref 8–22)
CALCIUM SERPL-MCNC: 7.3 MG/DL (ref 8.5–10.5)
CHLORIDE BLD-SCNC: 104 MMOL/L (ref 98–107)
CO2 SERPL-SCNC: 25 MMOL/L (ref 22–31)
COLOR UR AUTO: YELLOW
CREAT SERPL-MCNC: 0.53 MG/DL (ref 0.6–1.1)
GFR SERPL CREATININE-BSD FRML MDRD: >90 ML/MIN/1.73M2
GLUCOSE BLD-MCNC: 78 MG/DL (ref 70–125)
GLUCOSE UR STRIP-MCNC: NEGATIVE MG/DL
HGB UR QL STRIP: NEGATIVE
KETONES UR STRIP-MCNC: NEGATIVE MG/DL
LEUKOCYTE ESTERASE UR QL STRIP: ABNORMAL
NITRATE UR QL: NEGATIVE
PH UR STRIP: 7 [PH] (ref 5–8)
POTASSIUM BLD-SCNC: 3.8 MMOL/L (ref 3.5–5)
PROT SERPL-MCNC: 3.8 G/DL (ref 6–8)
RBC #/AREA URNS AUTO: ABNORMAL /HPF
SODIUM SERPL-SCNC: 138 MMOL/L (ref 136–145)
SP GR UR STRIP: 1.01 (ref 1–1.03)
SQUAMOUS #/AREA URNS AUTO: ABNORMAL /LPF
UROBILINOGEN UR STRIP-ACNC: 0.2 E.U./DL
WBC #/AREA URNS AUTO: ABNORMAL /HPF

## 2022-04-12 PROCEDURE — 99214 OFFICE O/P EST MOD 30 MIN: CPT | Performed by: FAMILY MEDICINE

## 2022-04-12 PROCEDURE — 81001 URINALYSIS AUTO W/SCOPE: CPT | Performed by: FAMILY MEDICINE

## 2022-04-12 PROCEDURE — 36415 COLL VENOUS BLD VENIPUNCTURE: CPT | Performed by: FAMILY MEDICINE

## 2022-04-12 PROCEDURE — 80053 COMPREHEN METABOLIC PANEL: CPT | Performed by: FAMILY MEDICINE

## 2022-04-12 PROCEDURE — 87086 URINE CULTURE/COLONY COUNT: CPT | Performed by: FAMILY MEDICINE

## 2022-04-12 NOTE — PROGRESS NOTES
ASSESSMENT/PLAN:  Brenda was seen today for foot.    Diagnoses and all orders for this visit:    Pedal edema  We will check on labs today.  Monitor symptoms.  I will follow  -     Comprehensive metabolic panel (BMP + Alb, Alk Phos, ALT, AST, Total. Bili, TP); Future  -     UA with Microscopic - lab collect; Future    SUBJECTIVE:    Brenda Archuleta is a 51 year old female who came in today     Patient presents with:  Foot: Swelling in both feet - pt states yesterday they were painful    The patient was in her normal state of health when she noted bipedal edema about a week ago.  This is the first time she has had this symptom.  She has no associated pain.  The swelling is of both her feet and lower legs.  She does not have any chest pain.  No shortness of breath.  She does not endorse any urinary symptoms.  She is not taking any medications at this time.  She has a history of Crohn's disease that is stable.    Review of Systems (except those mentioned above)  Constitutional: Negative.   HENT: Negative.   Eyes: Negative.   Respiratory: Negative.   Cardiovascular: Negative.   Gastrointestinal: Negative.   Endocrine: Negative.   Genitourinary: Negative.   Musculoskeletal: Negative.   Skin: Negative.   Allergic/Immunologic: Negative.   Neurological: Negative.   Hematological: Negative.   Psychiatric/Behavioral: Negative.     Patient Active Problem List    Diagnosis Date Noted     SBO (small bowel obstruction) (H) 02/11/2021     Priority: Medium     Crohn's Disease      Priority: Medium     Created by Conversion  Replacement Utility updated for latest IMO load         Exacerbation of Crohn's disease, without complications (H) 10/27/2019     Priority: Medium     Latent Tuberculosis      Priority: Medium     Created by Conversion         Allergies   Allergen Reactions     Corn [Corn Oil] Unknown     Other Food Allergy Unknown     CHICKPEA     Current Outpatient Medications   Medication Sig Dispense Refill     cholecalciferol,  vitamin D3, (VITAMIN D3 ORAL) [CHOLECALCIFEROL, VITAMIN D3, (VITAMIN D3 ORAL)] Take 1 capsule by mouth daily.        MULTIVITAMIN ORAL [MULTIVITAMIN ORAL] Take 1 tablet by mouth daily.       ondansetron (ZOFRAN) 8 MG tablet [ONDANSETRON (ZOFRAN) 8 MG TABLET] Take 1 tablet (8 mg total) by mouth every 8 (eight) hours as needed for nausea. 10 tablet 0     Past Medical History:   Diagnosis Date     Breast cyst 2009     Crohn's disease (H)      Nonspecific reaction to tuberculin skin test     Created by Conversion      Past Surgical History:   Procedure Laterality Date     BREAST CYST EXCISION Left 2009      SECTION      x1     OTHER SURGICAL HISTORY      Partial small bowel resection     Social History     Socioeconomic History     Marital status:      Spouse name: None     Number of children: 2     Years of education: None     Highest education level: None   Tobacco Use     Smoking status: Never Smoker     Smokeless tobacco: Never Used   Substance and Sexual Activity     Alcohol use: Never     Drug use: Never     No family history on file.      OBJECTIVE:    Vitals:    22 0927   BP: 98/62   Pulse: 95   SpO2: 99%   Weight: 42.1 kg (92 lb 12.8 oz)     Body mass index is 18.12 kg/m .    Physical Exam:  Constitutional: Patient is oriented to person, place, and time. Patient appears well-developed and well-nourished. No distress.   Head: Normocephalic and atraumatic.   Right Ear: External ear normal.   Left Ear: External ear normal.   Eyes: Conjunctivae and EOM are normal. Right eye exhibits no discharge. Left eye exhibits no discharge. No scleral icterus.   Neurological: Patient is alert and oriented to person, place, and time.  Skin: No rash noted. Patient is not diaphoretic. No erythema. No pallor.  Legs/feet: Nonpitting edema of both feet and lower leg up to the level of calves.  No erythema.  No rash.  No tenderness to palpation.      No results found for any visits on 22.

## 2022-04-14 LAB — BACTERIA UR CULT: NORMAL

## 2022-04-27 DIAGNOSIS — M79.89 BILATERAL SWELLING OF FEET: Primary | ICD-10-CM

## 2022-04-27 RX ORDER — FUROSEMIDE 20 MG
20 TABLET ORAL DAILY
Qty: 30 TABLET | Refills: 0 | Status: SHIPPED | OUTPATIENT
Start: 2022-04-27

## 2022-11-10 DIAGNOSIS — Z12.31 VISIT FOR SCREENING MAMMOGRAM: Primary | ICD-10-CM

## 2022-12-13 ENCOUNTER — OFFICE VISIT (OUTPATIENT)
Dept: FAMILY MEDICINE | Facility: CLINIC | Age: 52
End: 2022-12-13
Payer: COMMERCIAL

## 2022-12-13 VITALS
BODY MASS INDEX: 17.98 KG/M2 | SYSTOLIC BLOOD PRESSURE: 126 MMHG | TEMPERATURE: 97.6 F | RESPIRATION RATE: 17 BRPM | WEIGHT: 91.6 LBS | DIASTOLIC BLOOD PRESSURE: 68 MMHG | HEIGHT: 60 IN | OXYGEN SATURATION: 99 % | HEART RATE: 100 BPM

## 2022-12-13 DIAGNOSIS — Z23 NEED FOR VACCINATION: ICD-10-CM

## 2022-12-13 DIAGNOSIS — Z11.59 NEED FOR HEPATITIS C SCREENING TEST: ICD-10-CM

## 2022-12-13 DIAGNOSIS — Z13.220 SCREENING FOR HYPERLIPIDEMIA: ICD-10-CM

## 2022-12-13 DIAGNOSIS — R20.8 BURNING SENSATION: Primary | ICD-10-CM

## 2022-12-13 PROBLEM — R76.11 TUBERCULIN TEST REACTION: Status: ACTIVE | Noted: 2021-03-04

## 2022-12-13 PROCEDURE — 90472 IMMUNIZATION ADMIN EACH ADD: CPT | Performed by: FAMILY MEDICINE

## 2022-12-13 PROCEDURE — 90715 TDAP VACCINE 7 YRS/> IM: CPT | Performed by: FAMILY MEDICINE

## 2022-12-13 PROCEDURE — 99213 OFFICE O/P EST LOW 20 MIN: CPT | Mod: 25 | Performed by: FAMILY MEDICINE

## 2022-12-13 PROCEDURE — 90682 RIV4 VACC RECOMBINANT DNA IM: CPT | Performed by: FAMILY MEDICINE

## 2022-12-13 PROCEDURE — 90471 IMMUNIZATION ADMIN: CPT | Performed by: FAMILY MEDICINE

## 2022-12-13 ASSESSMENT — PATIENT HEALTH QUESTIONNAIRE - PHQ9
10. IF YOU CHECKED OFF ANY PROBLEMS, HOW DIFFICULT HAVE THESE PROBLEMS MADE IT FOR YOU TO DO YOUR WORK, TAKE CARE OF THINGS AT HOME, OR GET ALONG WITH OTHER PEOPLE: NOT DIFFICULT AT ALL
SUM OF ALL RESPONSES TO PHQ QUESTIONS 1-9: 1
SUM OF ALL RESPONSES TO PHQ QUESTIONS 1-9: 1

## 2022-12-13 NOTE — PROGRESS NOTES
Assessment & Plan     (R20.8) Burning sensation  (primary encounter diagnosis)  Comment: Subjective sensation of a burning sensation in the esophagus possible reflux  Plan:     (Z23) Need for vaccination  Comment:    Plan: TDAP VACCINE (Adacel, Boostrix), INFLUENZA         VACCINE 50-64 OR 18-64 W/EGG ALLERGY (FLUBLOK)             PLAN:  1.  Advised the patient use over-the-counter Prilosec for 2 weeks, if she continues to have the sensation then she needs to follow-up.  2.  Tdap and influenza vaccine                   No follow-ups on file.    Tonny Delcid MD  [facility]    Digna Gutierrez is a 52 year old, presenting for the following health issues:    Patient comes in with her , she states that just yesterday she started to have a burning sensation in the chest area, this happened after she ate and she did throw up.  She does have a history of Crohn's disease but she does not think that this is her Crohn's disease.    She has not taken anything for this she reports that a long time ago she had some similar symptoms but reports that she does not normally have a history of heartburn or reflux.    Patient was a bit vague on some details and there may be some language or cultural barriers but as best I could determine the symptoms were fairly acute, and I told the patient that I want her to try taking some stomach acid medication which will hopefully calm symptoms down, if that does not work then she needs to be reevaluated.    Patient is also due for several immunizations.      Possible reflex and Emesis  (It happens almost every time after eating.)      History of Present Illness       Reason for visit:  Gastrait    She eats 0-1 servings of fruits and vegetables daily.She consumes 2 sweetened beverage(s) daily.She exercises with enough effort to increase her heart rate 9 or less minutes per day.  She exercises with enough effort to increase her heart rate 3 or less  days per week.   She is taking medications regularly.    Today's PHQ-9         PHQ-9 Total Score: 1    PHQ-9 Q9 Thoughts of better off dead/self-harm past 2 weeks :   Not at all    How difficult have these problems made it for you to do your work, take care of things at home, or get along with other people: Not difficult at all             Review of Systems         Objective    /68   Pulse 100   Temp 97.6  F (36.4  C) (Temporal)   Resp 17   Ht 1.524 m (5')   Wt 41.5 kg (91 lb 9.6 oz)   SpO2 99%   BMI 17.89 kg/m    Body mass index is 17.89 kg/m .  Physical Exam